# Patient Record
(demographics unavailable — no encounter records)

---

## 2024-10-07 NOTE — ASSESSMENT
[FreeTextEntry1] : feels well Continue medications for ED followup after March 20 min discussion for prostate cancer followup

## 2024-10-07 NOTE — HISTORY OF PRESENT ILLNESS
[FreeTextEntry1] : Nov 2023  RALP PSA undetectable range to 0.02, 0.03 and now 0.04 Javi 7 CA negative nodes continent erections present

## 2025-03-26 NOTE — ADDENDUM
[FreeTextEntry1] : I, Lora Flores assisted in documentation on 03/26/2025 at acting as a scribe for and in the presence of Dr. Zay Perez.

## 2025-03-26 NOTE — HISTORY OF PRESENT ILLNESS
[FreeTextEntry1] : 59 yo male with history of prostate cancer presents today for a follow-up visit. S/p RALP 11/21/2023 Path: GG2, GS7 (3+4) (pT2, pN0)  Recent PSA: 0.07 (3.21.25) 0.04 on (10.4.24) 0.03 on (8.10.24) 0.02 on (5.13.24) First PSA after surgery: 0.02 (12.29.23)  Patient is doing well. Erections present- on Tadalafil 5mg daily

## 2025-03-26 NOTE — ASSESSMENT
[FreeTextEntry1] : Discussed post-surgically rising PSA- 0.07 We discussed the possibility of radiation if PSA continues to rise. Plan is to repeat PSA in 6 months. Rx Tadalafil 5mg refill sent to pharmacy.  Next follow-up appointment in 6 months. All questions answered patient agreeable with plan.   20-minutes for prostate cancer follow-up. Time spent is for reviewing chart, labs and images (if available), counseling and care coordination.